# Patient Record
Sex: FEMALE | Race: AMERICAN INDIAN OR ALASKA NATIVE | Employment: PART TIME | URBAN - METROPOLITAN AREA
[De-identification: names, ages, dates, MRNs, and addresses within clinical notes are randomized per-mention and may not be internally consistent; named-entity substitution may affect disease eponyms.]

---

## 2017-02-27 DIAGNOSIS — G25.3 MYOCLONUS DYSTONIA: ICD-10-CM

## 2017-02-27 DIAGNOSIS — M79.7 FIBROMYALGIA: ICD-10-CM

## 2017-02-27 DIAGNOSIS — G24.8 MYOCLONUS DYSTONIA: ICD-10-CM

## 2017-02-27 DIAGNOSIS — E11.9 TYPE 2 DIABETES MELLITUS WITHOUT COMPLICATION (H): ICD-10-CM

## 2017-02-27 DIAGNOSIS — K29.60 OTHER GASTRITIS WITHOUT HEMORRHAGE, UNSPECIFIED CHRONICITY: ICD-10-CM

## 2017-02-27 DIAGNOSIS — J34.89 RHINORRHEA: ICD-10-CM

## 2017-02-27 DIAGNOSIS — I10 BENIGN ESSENTIAL HYPERTENSION: ICD-10-CM

## 2017-02-27 RX ORDER — ASPIRIN 81 MG/1
81 TABLET, CHEWABLE ORAL DAILY
Qty: 90 TABLET | Refills: 2 | Status: SHIPPED | OUTPATIENT
Start: 2017-02-27

## 2017-02-27 RX ORDER — HYDROCHLOROTHIAZIDE 25 MG/1
25 TABLET ORAL DAILY
Qty: 90 TABLET | Refills: 2 | Status: SHIPPED | OUTPATIENT
Start: 2017-02-27

## 2017-02-27 RX ORDER — TRAZODONE HYDROCHLORIDE 50 MG/1
50 TABLET, FILM COATED ORAL AT BEDTIME
Qty: 30 TABLET | Refills: 3 | Status: SHIPPED | OUTPATIENT
Start: 2017-02-27

## 2017-02-27 RX ORDER — ROSUVASTATIN CALCIUM 20 MG/1
20 TABLET, COATED ORAL DAILY
Qty: 90 TABLET | Refills: 2 | Status: SHIPPED | OUTPATIENT
Start: 2017-02-27

## 2017-02-27 NOTE — TELEPHONE ENCOUNTER
metFORMIN (GLUCOPHAGE) 1000 MG tablet  Last Written Prescription Date:  8/9/16  Last Fill Quantity: 180,   # refills: 1  Last Office Visit with [facility] [place] or [facility] prescribing provider: 10/17/16  Future Office visit:   3/24/17    Creatinine   Date Value Ref Range Status   10/17/2016 0.98 0.52 - 1.04 mg/dL Final   ]  Lab Results   Component Value Date    A1C 9.5 10/17/2016    A1C 8.3 04/01/2016    A1C 8.8 01/11/2016     Lab Results   Component Value Date    MICROL 16 10/17/2016         omeprazole (PRILOSEC) 20 MG capsule  Last Written Prescription Date:  10/17/16  Last Fill Quantity: 90,   # refills: 1    hctz  Last Written Prescription Date:  8/9/16  Last Fill Quantity: 90,   # refills: 1  Potassium   Date Value Ref Range Status   10/17/2016 4.8 3.4 - 5.3 mmol/L Final   ]  Sodium 136  133 - 144 mmol/L Final 10/17/2016  3:56 PM 1740     Creatinine   Date Value Ref Range Status   10/17/2016 0.98 0.52 - 1.04 mg/dL Final   ]  BP Readings from Last 3 Encounters:   04/01/16 133/81   01/11/16 (!) 163/137   11/17/15 138/76      aspirin 81 MG chewable tablet  Last Written Prescription Date: 8/9/16  Last Fill Quantity: 90,   # refills: 1  No results found for: AST  No results found for: ALT      Routing refill request to provider for review/approval because:  aspirin 81 MG chewable tablet   No AST/ALT

## 2017-02-27 NOTE — TELEPHONE ENCOUNTER
rosuvastatin (CRESTOR) 20 MG tablet  Last Written Prescription Date:  8/9/16  Last Fill Quantity: 90,   # refills: 1  Last Office Visit with [facility] or [facility] prescribing provider: 10/17/16  Future Office visit:   3/24/17         sitagliptin (JANUVIA) 100 MG tablet   Last Written Prescription Date:  8/9/16  Last Fill Quantity: 90,   # refills: 1  Creatinine   Date Value Ref Range Status   10/17/2016 0.98 0.52 - 1.04 mg/dL Final   ]  Lab Results   Component Value Date    A1C 9.5 10/17/2016    A1C 8.3 04/01/2016    A1C 8.8 01/11/2016     Lab Results   Component Value Date    MICROL 16 10/17/2016        traZODone (DESYREL) 50 MG tablet   Last Written Prescription Date:  8/9/16  Last Fill Quantity: 30   # refills: 3       loratadine-pseudoePHEDrine (CLARITIN-D 24 HOUR)  MG per tablet   Last Written Prescription Date:  8/9/16  Last Fill Quantity: 90,   # refills: 1    Routing refill request to provider for review/approval because:     traZODone (DESYREL) 50 MG tablet.. Not on SO protocol for assc dx.

## 2017-04-07 ENCOUNTER — TELEPHONE (OUTPATIENT)
Dept: GASTROENTEROLOGY | Facility: OUTPATIENT CENTER | Age: 72
End: 2017-04-07

## 2017-04-11 ENCOUNTER — TELEPHONE (OUTPATIENT)
Dept: GASTROENTEROLOGY | Facility: OUTPATIENT CENTER | Age: 72
End: 2017-04-11

## 2017-07-25 ENCOUNTER — TELEPHONE (OUTPATIENT)
Dept: NEUROLOGY | Facility: CLINIC | Age: 72
End: 2017-07-25

## 2017-07-25 NOTE — TELEPHONE ENCOUNTER
----- Message from April Medina LPN sent at 7/20/2017 10:34 AM CDT -----  Regarding: questions  Caller name: Kylie Pritchard MD  [facility] [phone] cell [phone]  Treating provider/specialty: [facility]    Nurse:    Best time to return call:    Message left?     Description of issue/question:  DBS status  Somehow she fell off our radar and patient wants DBS SURGERY     Danielle Larsen   to Terrell Hager MD           5/9/16 7:13 AM   Dear Doctor Madan, Since it has been some time since I have heard about the possibility of DBS surgery to help control the Myoclonus-Dystonia I live with, I would like to know if I am still being considered??... I do want to go through with the procedure with hopes that I may live a more active, & normal life.Thank you, Danielle Larsen [address] [facility] [address]. Phone #( [phone]

## 2017-07-25 NOTE — TELEPHONE ENCOUNTER
"Spoke to Dr. Pritchard, patient's primary care provider in [place]. Patient is living in [place] again.  Explained where we were in the DEEP BRAIN STIMULATION process with patient. Shared our concerns for infection and alcohol abuse. PCP believes patient to be sober now but now understands the A1C needs to be under 8 with consistent blood sugar control. She states the patient is motivated for DEEP BRAIN STIMULATION. I explained that since the testing is over a year old, she would need to go through the testing all over again. She will talk to the patient and would appreciate if I would call her as well.    Called patient. She states she got ill last October and part of her intestine  - infection in stomach lining. Diabetes is still out of control - chronic kidney failure, A1C is in the 9's. Alcohol is only occasional and only because it is the only medication that relaxes her. \"Alcohol stops as soon as I know something else is available (referring to DBS). I don't drink daily, weekly, or monthly.\" When I told her that alcohol increases blood sugar she stated, \"Sometimes certain ones decrease it actually.\"  Patient is in complete denial that alcohol is or has been a problem for her.    Plan:  1. Patient will contact us when she is healthy physically, her diabetes is under control with A1C under 8.  2. She is aware that she is to contact us.  3. She is aware that she will need to repeat the DEEP BRAIN STIMULATION testing and that we may require a chem dep assessment.  "